# Patient Record
Sex: FEMALE | Race: WHITE | NOT HISPANIC OR LATINO | URBAN - METROPOLITAN AREA
[De-identification: names, ages, dates, MRNs, and addresses within clinical notes are randomized per-mention and may not be internally consistent; named-entity substitution may affect disease eponyms.]

---

## 2022-02-07 ENCOUNTER — OFFICE (OUTPATIENT)
Dept: URBAN - METROPOLITAN AREA CLINIC 9 | Facility: CLINIC | Age: 67
End: 2022-02-07

## 2022-02-07 VITALS
DIASTOLIC BLOOD PRESSURE: 82 MMHG | OXYGEN SATURATION: 98 % | HEART RATE: 75 BPM | SYSTOLIC BLOOD PRESSURE: 166 MMHG | WEIGHT: 145 LBS | HEIGHT: 64 IN

## 2022-02-07 DIAGNOSIS — K57.92 DIVERTICULITIS OF INTESTINE, PART UNSPECIFIED, WITHOUT PERFO: ICD-10-CM

## 2022-02-07 PROCEDURE — 99203 OFFICE O/P NEW LOW 30 MIN: CPT | Performed by: NURSE PRACTITIONER

## 2022-02-07 RX ORDER — POLYETHYLENE GLYCOL 3350, SODIUM SULFATE, SODIUM CHLORIDE, POTASSIUM CHLORIDE, ASCORBIC ACID, SODIUM ASCORBATE 140-9-5.2G
KIT ORAL
Qty: 1 | Refills: 0 | Status: ACTIVE
Start: 2022-02-07

## 2022-02-07 NOTE — SERVICENOTES
Patient was treated for questionable diverticulitis by PCP over 2 months ago with antibiotics.  Patient's pain did improve.  She never had any imaging to  confirm this.  Will proceed with screening colonoscopy as she is overdue.

## 2022-02-07 NOTE — SERVICEHPINOTES
Ms. Burns  is a pleasant 66-year-old  female with a PMH significant for breast cancer status post surgery 2006, appendectomy 1970, diverticulosis /diverticulitis most recently 2 months ago treated by PCP with antibiotics.   Patient presents as she is overdue for screening colonoscopy.  She was previously known to Dr. Thomas  with gastro 1 and underwent colonoscopy in 2008 which was unremarkable and recommended a 10 year recall.  She is currently overdue for screening colonoscopy.  Patient states she has been doing well in no issues  until 2 months ago where she was having right lower quadrant abdominal pain.  She states she saw her PCP and he thought this was possibly diverticulitis and prescribed antibiotics.  She has completed antibiotics over a month ago and states this does improve her symptoms.  She denies having any imaging studies of her abdomen to confirm diverticulitis.  She states this is her 1st time ever having diverticulitis.  Since being treated with antibiotics for diverticulitis she has had a little bit of irritability in her GI tract with some gas and soft/ formed stools.  No melena or hematochezia.  Denies any frequent NSAID use.  No abdominal pain.  She does admit she soils her underwear on occasion.  Again, stools are formed and soft.  She denies any known family history of colon cancer, stomach cancer, esophageal cancer.  No upper GI symptoms such as heartburn, reflux, dysphagia, nausea or vomiting.  No weight loss. 
br
br   No cardiac issues.  No history of stroke, heart attack, cardiac stents.  No chronic anticoagulants.?[ROS Wording]